# Patient Record
Sex: MALE | Race: WHITE | NOT HISPANIC OR LATINO | Employment: OTHER | URBAN - NONMETROPOLITAN AREA
[De-identification: names, ages, dates, MRNs, and addresses within clinical notes are randomized per-mention and may not be internally consistent; named-entity substitution may affect disease eponyms.]

---

## 2023-08-09 ENCOUNTER — APPOINTMENT (OUTPATIENT)
Dept: PRIMARY CARE | Facility: CLINIC | Age: 78
End: 2023-08-09
Payer: MEDICARE

## 2023-08-15 PROBLEM — I25.10 CORONARY ARTERY DISEASE INVOLVING NATIVE CORONARY ARTERY OF NATIVE HEART WITHOUT ANGINA PECTORIS: Status: ACTIVE | Noted: 2023-08-15

## 2023-08-15 PROBLEM — E11.9 DM2 (DIABETES MELLITUS, TYPE 2) (MULTI): Status: ACTIVE | Noted: 2023-08-15

## 2023-08-15 PROBLEM — G31.9 BRAIN ATROPHY (CMS-HCC): Status: ACTIVE | Noted: 2023-08-15

## 2023-08-15 PROBLEM — H53.2 DIPLOPIA: Status: ACTIVE | Noted: 2023-08-15

## 2023-08-15 PROBLEM — R91.1 NODULE OF RIGHT LUNG: Status: ACTIVE | Noted: 2023-08-15

## 2023-08-15 PROBLEM — N52.9 ED (ERECTILE DYSFUNCTION): Status: ACTIVE | Noted: 2023-08-15

## 2023-08-15 PROBLEM — I25.2 HISTORY OF ST ELEVATION MYOCARDIAL INFARCTION (STEMI): Status: ACTIVE | Noted: 2023-08-15

## 2023-08-15 PROBLEM — E78.5 HYPERLIPIDEMIA: Status: ACTIVE | Noted: 2023-08-15

## 2023-08-15 PROBLEM — L57.0 ACTINIC KERATOSES: Status: ACTIVE | Noted: 2023-08-15

## 2023-08-15 LAB
ALANINE AMINOTRANSFERASE (SGPT) (U/L) IN SER/PLAS: 40 U/L (ref 10–52)
ALBUMIN (G/DL) IN SER/PLAS: 4.3 G/DL (ref 3.4–5)
ALBUMIN (MG/L) IN URINE: 8.3 MG/L
ALBUMIN/CREATININE (UG/MG) IN URINE: 5.5 UG/MG CRT (ref 0–30)
ALKALINE PHOSPHATASE (U/L) IN SER/PLAS: 61 U/L (ref 33–136)
ANION GAP IN SER/PLAS: 11 MMOL/L (ref 10–20)
ASPARTATE AMINOTRANSFERASE (SGOT) (U/L) IN SER/PLAS: 21 U/L (ref 9–39)
BASOPHILS (10*3/UL) IN BLOOD BY AUTOMATED COUNT: 0.03 X10E9/L (ref 0–0.1)
BASOPHILS/100 LEUKOCYTES IN BLOOD BY AUTOMATED COUNT: 0.6 % (ref 0–2)
BILIRUBIN TOTAL (MG/DL) IN SER/PLAS: 0.7 MG/DL (ref 0–1.2)
CALCIUM (MG/DL) IN SER/PLAS: 9.5 MG/DL (ref 8.6–10.6)
CARBON DIOXIDE, TOTAL (MMOL/L) IN SER/PLAS: 27 MMOL/L (ref 21–32)
CHLORIDE (MMOL/L) IN SER/PLAS: 106 MMOL/L (ref 98–107)
CHOLESTEROL (MG/DL) IN SER/PLAS: 130 MG/DL (ref 0–199)
CHOLESTEROL IN HDL (MG/DL) IN SER/PLAS: 48.5 MG/DL
CHOLESTEROL/HDL RATIO: 2.7
CREATININE (MG/DL) IN SER/PLAS: 0.84 MG/DL (ref 0.5–1.3)
CREATININE (MG/DL) IN URINE: 151 MG/DL (ref 20–370)
EOSINOPHILS (10*3/UL) IN BLOOD BY AUTOMATED COUNT: 0.09 X10E9/L (ref 0–0.4)
EOSINOPHILS/100 LEUKOCYTES IN BLOOD BY AUTOMATED COUNT: 1.9 % (ref 0–6)
ERYTHROCYTE DISTRIBUTION WIDTH (RATIO) BY AUTOMATED COUNT: 12.5 % (ref 11.5–14.5)
ERYTHROCYTE MEAN CORPUSCULAR HEMOGLOBIN CONCENTRATION (G/DL) BY AUTOMATED: 32.8 G/DL (ref 32–36)
ERYTHROCYTE MEAN CORPUSCULAR VOLUME (FL) BY AUTOMATED COUNT: 103 FL (ref 80–100)
ERYTHROCYTES (10*6/UL) IN BLOOD BY AUTOMATED COUNT: 4.13 X10E12/L (ref 4.5–5.9)
ESTIMATED AVERAGE GLUCOSE FOR HBA1C: 151 MG/DL
GFR MALE: 89 ML/MIN/1.73M2
GLUCOSE (MG/DL) IN SER/PLAS: 117 MG/DL (ref 74–99)
HEMATOCRIT (%) IN BLOOD BY AUTOMATED COUNT: 42.7 % (ref 41–52)
HEMOGLOBIN (G/DL) IN BLOOD: 14 G/DL (ref 13.5–17.5)
HEMOGLOBIN A1C/HEMOGLOBIN TOTAL IN BLOOD: 6.9 %
IMMATURE GRANULOCYTES/100 LEUKOCYTES IN BLOOD BY AUTOMATED COUNT: 0.2 % (ref 0–0.9)
LDL: 66 MG/DL (ref 0–99)
LEUKOCYTES (10*3/UL) IN BLOOD BY AUTOMATED COUNT: 4.9 X10E9/L (ref 4.4–11.3)
LYMPHOCYTES (10*3/UL) IN BLOOD BY AUTOMATED COUNT: 1.45 X10E9/L (ref 0.8–3)
LYMPHOCYTES/100 LEUKOCYTES IN BLOOD BY AUTOMATED COUNT: 29.8 % (ref 13–44)
MONOCYTES (10*3/UL) IN BLOOD BY AUTOMATED COUNT: 0.55 X10E9/L (ref 0.05–0.8)
MONOCYTES/100 LEUKOCYTES IN BLOOD BY AUTOMATED COUNT: 11.3 % (ref 2–10)
NEUTROPHILS (10*3/UL) IN BLOOD BY AUTOMATED COUNT: 2.73 X10E9/L (ref 1.6–5.5)
NEUTROPHILS/100 LEUKOCYTES IN BLOOD BY AUTOMATED COUNT: 56.2 % (ref 40–80)
NRBC (PER 100 WBCS) BY AUTOMATED COUNT: 0 /100 WBC (ref 0–0)
PLATELETS (10*3/UL) IN BLOOD AUTOMATED COUNT: 182 X10E9/L (ref 150–450)
POTASSIUM (MMOL/L) IN SER/PLAS: 4.5 MMOL/L (ref 3.5–5.3)
PROTEIN TOTAL: 7.4 G/DL (ref 6.4–8.2)
SODIUM (MMOL/L) IN SER/PLAS: 139 MMOL/L (ref 136–145)
TRIGLYCERIDE (MG/DL) IN SER/PLAS: 80 MG/DL (ref 0–149)
UREA NITROGEN (MG/DL) IN SER/PLAS: 20 MG/DL (ref 6–23)
VLDL: 16 MG/DL (ref 0–40)

## 2023-08-15 RX ORDER — ATORVASTATIN CALCIUM 80 MG/1
TABLET, FILM COATED ORAL
COMMUNITY
Start: 2022-02-03

## 2023-08-15 RX ORDER — SILDENAFIL 100 MG/1
TABLET, FILM COATED ORAL
COMMUNITY
End: 2024-02-16 | Stop reason: SDUPTHER

## 2023-08-15 RX ORDER — ASPIRIN 81 MG/1
1 TABLET ORAL DAILY
COMMUNITY
Start: 2022-02-09

## 2023-08-15 RX ORDER — CARVEDILOL 6.25 MG/1
TABLET ORAL 2 TIMES DAILY
COMMUNITY
Start: 2022-02-03

## 2023-08-16 ENCOUNTER — OFFICE VISIT (OUTPATIENT)
Dept: PRIMARY CARE | Facility: CLINIC | Age: 78
End: 2023-08-16
Payer: MEDICARE

## 2023-08-16 VITALS
TEMPERATURE: 97.9 F | WEIGHT: 221.8 LBS | SYSTOLIC BLOOD PRESSURE: 102 MMHG | HEIGHT: 69 IN | HEART RATE: 77 BPM | BODY MASS INDEX: 32.85 KG/M2 | OXYGEN SATURATION: 92 % | DIASTOLIC BLOOD PRESSURE: 65 MMHG

## 2023-08-16 DIAGNOSIS — E11.9 TYPE 2 DIABETES MELLITUS WITHOUT COMPLICATION, WITHOUT LONG-TERM CURRENT USE OF INSULIN (MULTI): ICD-10-CM

## 2023-08-16 DIAGNOSIS — E78.5 HYPERLIPIDEMIA, UNSPECIFIED HYPERLIPIDEMIA TYPE: ICD-10-CM

## 2023-08-16 DIAGNOSIS — I25.2 HISTORY OF ST ELEVATION MYOCARDIAL INFARCTION (STEMI): ICD-10-CM

## 2023-08-16 DIAGNOSIS — Z00.00 ROUTINE GENERAL MEDICAL EXAMINATION AT HEALTH CARE FACILITY: Primary | ICD-10-CM

## 2023-08-16 DIAGNOSIS — I25.10 CORONARY ARTERY DISEASE INVOLVING NATIVE CORONARY ARTERY OF NATIVE HEART WITHOUT ANGINA PECTORIS: ICD-10-CM

## 2023-08-16 PROCEDURE — G0439 PPPS, SUBSEQ VISIT: HCPCS | Performed by: FAMILY MEDICINE

## 2023-08-16 PROCEDURE — 1036F TOBACCO NON-USER: CPT | Performed by: FAMILY MEDICINE

## 2023-08-16 PROCEDURE — 1170F FXNL STATUS ASSESSED: CPT | Performed by: FAMILY MEDICINE

## 2023-08-16 PROCEDURE — 1159F MED LIST DOCD IN RCRD: CPT | Performed by: FAMILY MEDICINE

## 2023-08-16 RX ORDER — CALCIUM CARBONATE 200(500)MG
1 TABLET,CHEWABLE ORAL DAILY
COMMUNITY

## 2023-08-16 RX ORDER — CLOPIDOGREL BISULFATE 75 MG/1
75 TABLET ORAL DAILY
COMMUNITY
End: 2024-02-16 | Stop reason: ALTCHOICE

## 2023-08-16 ASSESSMENT — ACTIVITIES OF DAILY LIVING (ADL)
TAKING_MEDICATION: INDEPENDENT
DOING_HOUSEWORK: INDEPENDENT
MANAGING_FINANCES: INDEPENDENT
BATHING: INDEPENDENT
DRESSING: INDEPENDENT
GROCERY_SHOPPING: INDEPENDENT

## 2023-08-16 ASSESSMENT — PATIENT HEALTH QUESTIONNAIRE - PHQ9
1. LITTLE INTEREST OR PLEASURE IN DOING THINGS: NOT AT ALL
2. FEELING DOWN, DEPRESSED OR HOPELESS: NOT AT ALL
SUM OF ALL RESPONSES TO PHQ9 QUESTIONS 1 AND 2: 0

## 2023-08-16 ASSESSMENT — ENCOUNTER SYMPTOMS
OCCASIONAL FEELINGS OF UNSTEADINESS: 0
LOSS OF SENSATION IN FEET: 0
DEPRESSION: 0

## 2023-08-16 NOTE — PROGRESS NOTES
"Subjective   Reason for Visit: Torres Price is an 77 y.o. male here for a Medicare Wellness visit.          Reviewed all medications by prescribing practitioner or clinical pharmacist (such as prescriptions, OTCs, herbal therapies and supplements) and documented in the medical record.    HPI  Questions about  CPE vs.AWV     Dm2   Lab Results   Component Value Date    HGBA1C 6.9 (A) 08/14/2023   `    CAD  HL   [facility] cardioloogy   Exertional sob   Etoh dinner and hs .   Lipids - reviewed     Questions about -  cardiac rehab - can he restart/ resume     Patient Care Team:  Deanna Carranza MD as PCP - General (Family Medicine)  Deanna Carranza MD as PCP - [employer] PCP     Review of Systems   Constitutional:  Negative for fever and unexpected weight change.   HENT:  Negative for dental problem and sore throat.    Eyes:  Negative for visual disturbance.   Respiratory:  Positive for shortness of breath. Negative for cough.    Cardiovascular:  Negative for chest pain and palpitations.   Genitourinary:  Negative for dysuria and hematuria.   Musculoskeletal:  Negative for arthralgias and back pain.   Skin:  Negative for rash.   Neurological:  Negative for syncope, light-headedness and headaches.   Hematological:  Negative for adenopathy. Does not bruise/bleed easily.   Psychiatric/Behavioral:  Negative for sleep disturbance.        Objective   Vitals:  /65 (BP Location: Left arm, Patient Position: Sitting, BP Cuff Size: Large adult)   Pulse 77   Temp 36.6 °C (97.9 °F) (Temporal)   Ht 1.753 m (5' 9\")   Wt 101 kg (221 lb 12.8 oz)   SpO2 92%   BMI 32.75 kg/m²       Physical Exam  Vitals and nursing note reviewed.   Constitutional:       General: He is not in acute distress.     Appearance: Normal appearance.   Cardiovascular:      Heart sounds: Normal heart sounds.   Pulmonary:      Breath sounds: Normal breath sounds.   Abdominal:      General: Bowel sounds are normal.      Palpations: Abdomen is soft. "   Musculoskeletal:         General: Normal range of motion.      Cervical back: Neck supple.   Neurological:      General: No focal deficit present.      Mental Status: He is alert and oriented to person, place, and time.         Assessment/Plan   Problem List Items Addressed This Visit          High    Coronary artery disease involving native coronary artery of native heart without angina pectoris    Relevant Medications    ticagrelor (Brilinta) 90 mg tablet    sildenafil (Viagra) 100 mg tablet    carvedilol (Coreg) 6.25 mg tablet    clopidogrel (Plavix) 75 mg tablet    DM2 (diabetes mellitus, type 2) (CMS/[facility])    History of ST elevation myocardial infarction (STEMI)    Hyperlipidemia     Other Visit Diagnoses       Routine general medical examination at health care facility    -  Primary          Wellness  - preventive care and health maintenance reviewed and discussed   CAD  - urged to make appt w cardiology regarding his symptoms .  Continue current medications as prescribed

## 2023-08-18 ASSESSMENT — ENCOUNTER SYMPTOMS
ADENOPATHY: 0
ARTHRALGIAS: 0
UNEXPECTED WEIGHT CHANGE: 0
COUGH: 0
SLEEP DISTURBANCE: 0
LIGHT-HEADEDNESS: 0
DYSURIA: 0
SORE THROAT: 0
SHORTNESS OF BREATH: 1
BRUISES/BLEEDS EASILY: 0
FEVER: 0
HEMATURIA: 0
PALPITATIONS: 0
HEADACHES: 0
BACK PAIN: 0

## 2024-02-01 ENCOUNTER — TELEPHONE (OUTPATIENT)
Dept: PRIMARY CARE | Facility: CLINIC | Age: 79
End: 2024-02-01

## 2024-02-01 DIAGNOSIS — E11.9 TYPE 2 DIABETES MELLITUS WITHOUT COMPLICATION, WITHOUT LONG-TERM CURRENT USE OF INSULIN (MULTI): Primary | ICD-10-CM

## 2024-02-01 DIAGNOSIS — E78.5 HYPERLIPIDEMIA, UNSPECIFIED HYPERLIPIDEMIA TYPE: ICD-10-CM

## 2024-02-01 NOTE — TELEPHONE ENCOUNTER
Patient has upcoming appointment, asking if lab orders can be placed prior to seeing you     Would like a call once ordered

## 2024-02-06 ENCOUNTER — LAB (OUTPATIENT)
Dept: LAB | Facility: LAB | Age: 79
End: 2024-02-06
Payer: MEDICARE

## 2024-02-06 DIAGNOSIS — E78.5 HYPERLIPIDEMIA, UNSPECIFIED HYPERLIPIDEMIA TYPE: ICD-10-CM

## 2024-02-06 DIAGNOSIS — E11.9 TYPE 2 DIABETES MELLITUS WITHOUT COMPLICATION, WITHOUT LONG-TERM CURRENT USE OF INSULIN (MULTI): ICD-10-CM

## 2024-02-06 LAB
ALBUMIN SERPL BCP-MCNC: 4 G/DL (ref 3.4–5)
ALP SERPL-CCNC: 67 U/L (ref 33–136)
ALT SERPL W P-5'-P-CCNC: 36 U/L (ref 10–52)
ANION GAP SERPL CALC-SCNC: 12 MMOL/L (ref 10–20)
AST SERPL W P-5'-P-CCNC: 23 U/L (ref 9–39)
BILIRUB SERPL-MCNC: 0.7 MG/DL (ref 0–1.2)
BUN SERPL-MCNC: 16 MG/DL (ref 6–23)
CALCIUM SERPL-MCNC: 9.5 MG/DL (ref 8.6–10.6)
CHLORIDE SERPL-SCNC: 105 MMOL/L (ref 98–107)
CHOLEST SERPL-MCNC: 116 MG/DL (ref 0–199)
CHOLESTEROL/HDL RATIO: 2.5
CO2 SERPL-SCNC: 27 MMOL/L (ref 21–32)
CREAT SERPL-MCNC: 0.91 MG/DL (ref 0.5–1.3)
EGFRCR SERPLBLD CKD-EPI 2021: 86 ML/MIN/1.73M*2
EST. AVERAGE GLUCOSE BLD GHB EST-MCNC: 169 MG/DL
GLUCOSE SERPL-MCNC: 130 MG/DL (ref 74–99)
HBA1C MFR BLD: 7.5 %
HDLC SERPL-MCNC: 46.4 MG/DL
LDLC SERPL CALC-MCNC: 53 MG/DL
NON HDL CHOLESTEROL: 70 MG/DL (ref 0–149)
POTASSIUM SERPL-SCNC: 4.3 MMOL/L (ref 3.5–5.3)
PROT SERPL-MCNC: 7.3 G/DL (ref 6.4–8.2)
SODIUM SERPL-SCNC: 140 MMOL/L (ref 136–145)
TRIGL SERPL-MCNC: 82 MG/DL (ref 0–149)
VLDL: 16 MG/DL (ref 0–40)

## 2024-02-06 PROCEDURE — 80061 LIPID PANEL: CPT

## 2024-02-06 PROCEDURE — 36415 COLL VENOUS BLD VENIPUNCTURE: CPT

## 2024-02-06 PROCEDURE — 80053 COMPREHEN METABOLIC PANEL: CPT

## 2024-02-06 PROCEDURE — 83036 HEMOGLOBIN GLYCOSYLATED A1C: CPT

## 2024-02-06 NOTE — TELEPHONE ENCOUNTER
Pt went and had his labs done today and said they did not do a urine Albumin. He said the reason he was asking is that he has been having some lower back pain but really notices some pain when he is cutting firewood and not sure if he should have this test done?   I asked about uti symptoms and he said it;s not that and that his son told him may be related to taking Atorvastatin and having an alcoholic beverage. But he does this separately. Pt is aware you are out of the office today and said ok to wait for you and he has an apt with you next week.

## 2024-02-07 NOTE — TELEPHONE ENCOUNTER
I did not order a urine albumin because the urine albumin  was normal  8/14/2023  .  We can discuss this at next visit  . If he can't wait till then, set up a virtual

## 2024-02-16 ENCOUNTER — OFFICE VISIT (OUTPATIENT)
Dept: PRIMARY CARE | Facility: CLINIC | Age: 79
End: 2024-02-16
Payer: MEDICARE

## 2024-02-16 VITALS
BODY MASS INDEX: 33.26 KG/M2 | TEMPERATURE: 97.7 F | WEIGHT: 225.2 LBS | SYSTOLIC BLOOD PRESSURE: 105 MMHG | OXYGEN SATURATION: 93 % | HEART RATE: 67 BPM | DIASTOLIC BLOOD PRESSURE: 68 MMHG

## 2024-02-16 DIAGNOSIS — M25.551 PAIN OF RIGHT HIP: ICD-10-CM

## 2024-02-16 DIAGNOSIS — M51.36 DEGENERATIVE DISC DISEASE, LUMBAR: ICD-10-CM

## 2024-02-16 DIAGNOSIS — E11.9 TYPE 2 DIABETES MELLITUS WITHOUT COMPLICATION, WITHOUT LONG-TERM CURRENT USE OF INSULIN (MULTI): Primary | ICD-10-CM

## 2024-02-16 DIAGNOSIS — N52.9 ERECTILE DYSFUNCTION, UNSPECIFIED ERECTILE DYSFUNCTION TYPE: ICD-10-CM

## 2024-02-16 DIAGNOSIS — E78.5 HYPERLIPIDEMIA, UNSPECIFIED HYPERLIPIDEMIA TYPE: ICD-10-CM

## 2024-02-16 DIAGNOSIS — Z11.59 NEED FOR HEPATITIS C SCREENING TEST: ICD-10-CM

## 2024-02-16 PROCEDURE — 99214 OFFICE O/P EST MOD 30 MIN: CPT | Performed by: FAMILY MEDICINE

## 2024-02-16 PROCEDURE — 1159F MED LIST DOCD IN RCRD: CPT | Performed by: FAMILY MEDICINE

## 2024-02-16 PROCEDURE — 1036F TOBACCO NON-USER: CPT | Performed by: FAMILY MEDICINE

## 2024-02-16 RX ORDER — SILDENAFIL 100 MG/1
100 TABLET, FILM COATED ORAL AS NEEDED
Qty: 30 TABLET | Refills: 1 | Status: SHIPPED | OUTPATIENT
Start: 2024-02-16

## 2024-02-16 NOTE — PROGRESS NOTES
Subjective   Patient ID: Torres Price is a 78 y.o. male who presents for Follow-up (Pt is here for a 6 month follow up.  Pt states he still has lower back pain. Both sides with lifting and bending. He is concerned with his right hip. Painful to cross leg. ).  HPI  Pt here for 6 month visit.      Interval Health :  feels okay.  Has more pain than he used to , doing usual things .  Also notices a change in leg / right hip .     No ER visits, surgeries, hospitalizations ,new doctors.     Had Cardiology c/s, see notes in EMR  .  Off Plavix.     Requests refill on Sildenafil, to [facility]        Review of Systems    Objective   /68 (BP Location: Right arm, Patient Position: Sitting, BP Cuff Size: Large adult)   Pulse 67   Temp 36.5 °C (97.7 °F) (Temporal)   Wt 102 kg (225 lb 3.2 oz)   SpO2 93%   BMI 33.26 kg/m²     Physical Exam  Vitals reviewed.   Constitutional:       General: He is not in acute distress.  Cardiovascular:      Rate and Rhythm: Normal rate and regular rhythm.      Heart sounds: Normal heart sounds.   Pulmonary:      Effort: Pulmonary effort is normal.      Breath sounds: No wheezing or rales.   Neurological:      General: No focal deficit present.      Mental Status: He is alert.     Slr negative. Limited internal and ext rotation, right hip .  Pain over lumbar parasp muscles.   Reflexes 2/4 bilaterally.       Assessment/Plan   Problem List Items Addressed This Visit          High    DM2 (diabetes mellitus, type 2) (CMS/[facility]) - Primary    Relevant Orders    Follow Up In [facility] - PCP - Medicare Annual    Albumin , Urine Random    Hemoglobin A1C    Comprehensive Metabolic Panel    Hyperlipidemia    Relevant Orders    Follow Up In [facility] - PCP - Medicare Annual    CBC and Auto Differential    Lipid Panel       Medium    ED (erectile dysfunction)    Relevant Medications    sildenafil (Viagra) 100 mg tablet    Other Relevant Orders    CBC and Auto Differential     Other  Visit Diagnoses       Degenerative disc disease, lumbar        Relevant Orders    Referral to Physical Therapy    Need for hepatitis C screening test        Relevant Orders    Hepatitis C Antibody    Pain of right hip            DM2  Lab Results   Component Value Date    HGBA1C 7.5 (H) 02/06/2024   Recommend low sugar diet ,  avoid alcohol .   Goal, less than 7%     Hyperlipidemia  At goal.   Continue current regimen.   Meds renewed by Cardiology     ED  Med renewed    Degen Changes, back   Has questions about treatment . Interested in therapy .  He has a place in [place] he may go to. Will give him external referral.     Hip abnormality   Likely arthritis .  Like in his back .    Will monitor sxs.       6mo follow up  ,AWV   He willcontact a provider for Physical Therapy  Lab andurine prior to next appt      JORDON Carranza MD

## 2024-08-09 ENCOUNTER — TELEPHONE (OUTPATIENT)
Dept: PRIMARY CARE | Facility: CLINIC | Age: 79
End: 2024-08-09
Payer: MEDICARE

## 2024-08-09 DIAGNOSIS — R71.8 ABNORMAL RBC: ICD-10-CM

## 2024-08-09 DIAGNOSIS — R53.83 OTHER FATIGUE: Primary | ICD-10-CM

## 2024-08-09 NOTE — TELEPHONE ENCOUNTER
Patient asking if B12 and iron labs can be ordered for his upcoming appointment    He has been feeling very tired lately

## 2024-08-13 ENCOUNTER — LAB (OUTPATIENT)
Dept: LAB | Facility: LAB | Age: 79
End: 2024-08-13
Payer: MEDICARE

## 2024-08-13 DIAGNOSIS — E78.5 HYPERLIPIDEMIA, UNSPECIFIED HYPERLIPIDEMIA TYPE: ICD-10-CM

## 2024-08-13 DIAGNOSIS — R53.83 OTHER FATIGUE: ICD-10-CM

## 2024-08-13 DIAGNOSIS — Z11.59 NEED FOR HEPATITIS C SCREENING TEST: ICD-10-CM

## 2024-08-13 DIAGNOSIS — R71.8 ABNORMAL RBC: ICD-10-CM

## 2024-08-13 DIAGNOSIS — N52.9 ERECTILE DYSFUNCTION, UNSPECIFIED ERECTILE DYSFUNCTION TYPE: ICD-10-CM

## 2024-08-13 DIAGNOSIS — E11.9 TYPE 2 DIABETES MELLITUS WITHOUT COMPLICATION, WITHOUT LONG-TERM CURRENT USE OF INSULIN (MULTI): ICD-10-CM

## 2024-08-13 PROCEDURE — 82043 UR ALBUMIN QUANTITATIVE: CPT

## 2024-08-13 PROCEDURE — 83540 ASSAY OF IRON: CPT

## 2024-08-13 PROCEDURE — 83550 IRON BINDING TEST: CPT

## 2024-08-13 PROCEDURE — 82607 VITAMIN B-12: CPT

## 2024-08-13 PROCEDURE — 80061 LIPID PANEL: CPT

## 2024-08-13 PROCEDURE — 86803 HEPATITIS C AB TEST: CPT

## 2024-08-13 PROCEDURE — 82570 ASSAY OF URINE CREATININE: CPT

## 2024-08-13 PROCEDURE — 85025 COMPLETE CBC W/AUTO DIFF WBC: CPT

## 2024-08-13 PROCEDURE — 83036 HEMOGLOBIN GLYCOSYLATED A1C: CPT

## 2024-08-13 PROCEDURE — 80053 COMPREHEN METABOLIC PANEL: CPT

## 2024-08-13 PROCEDURE — 36415 COLL VENOUS BLD VENIPUNCTURE: CPT

## 2024-08-14 LAB
ALBUMIN SERPL BCP-MCNC: 4.1 G/DL (ref 3.4–5)
ALP SERPL-CCNC: 60 U/L (ref 33–136)
ALT SERPL W P-5'-P-CCNC: 38 U/L (ref 10–52)
ANION GAP SERPL CALC-SCNC: 11 MMOL/L (ref 10–20)
AST SERPL W P-5'-P-CCNC: 20 U/L (ref 9–39)
BASOPHILS # BLD AUTO: 0.05 X10*3/UL (ref 0–0.1)
BASOPHILS NFR BLD AUTO: 1.1 %
BILIRUB SERPL-MCNC: 0.6 MG/DL (ref 0–1.2)
BUN SERPL-MCNC: 20 MG/DL (ref 6–23)
CALCIUM SERPL-MCNC: 9.3 MG/DL (ref 8.6–10.6)
CHLORIDE SERPL-SCNC: 103 MMOL/L (ref 98–107)
CHOLEST SERPL-MCNC: 115 MG/DL (ref 0–199)
CHOLESTEROL/HDL RATIO: 2.9
CO2 SERPL-SCNC: 29 MMOL/L (ref 21–32)
CREAT SERPL-MCNC: 0.84 MG/DL (ref 0.5–1.3)
CREAT UR-MCNC: 129.9 MG/DL (ref 20–370)
EGFRCR SERPLBLD CKD-EPI 2021: 89 ML/MIN/1.73M*2
EOSINOPHIL # BLD AUTO: 0.08 X10*3/UL (ref 0–0.4)
EOSINOPHIL NFR BLD AUTO: 1.8 %
ERYTHROCYTE [DISTWIDTH] IN BLOOD BY AUTOMATED COUNT: 12.3 % (ref 11.5–14.5)
EST. AVERAGE GLUCOSE BLD GHB EST-MCNC: 166 MG/DL
GLUCOSE SERPL-MCNC: 129 MG/DL (ref 74–99)
HBA1C MFR BLD: 7.4 %
HCT VFR BLD AUTO: 43.1 % (ref 41–52)
HCV AB SER QL: NONREACTIVE
HDLC SERPL-MCNC: 39.6 MG/DL
HGB BLD-MCNC: 14.1 G/DL (ref 13.5–17.5)
IMM GRANULOCYTES # BLD AUTO: 0 X10*3/UL (ref 0–0.5)
IMM GRANULOCYTES NFR BLD AUTO: 0 % (ref 0–0.9)
IRON SATN MFR SERPL: 35 % (ref 25–45)
IRON SERPL-MCNC: 103 UG/DL (ref 35–150)
LDLC SERPL CALC-MCNC: 59 MG/DL
LYMPHOCYTES # BLD AUTO: 1.49 X10*3/UL (ref 0.8–3)
LYMPHOCYTES NFR BLD AUTO: 33.9 %
MCH RBC QN AUTO: 32.3 PG (ref 26–34)
MCHC RBC AUTO-ENTMCNC: 32.7 G/DL (ref 32–36)
MCV RBC AUTO: 99 FL (ref 80–100)
MICROALBUMIN UR-MCNC: <7 MG/L
MICROALBUMIN/CREAT UR: NORMAL MG/G{CREAT}
MONOCYTES # BLD AUTO: 0.53 X10*3/UL (ref 0.05–0.8)
MONOCYTES NFR BLD AUTO: 12 %
NEUTROPHILS # BLD AUTO: 2.25 X10*3/UL (ref 1.6–5.5)
NEUTROPHILS NFR BLD AUTO: 51.2 %
NON HDL CHOLESTEROL: 75 MG/DL (ref 0–149)
NRBC BLD-RTO: 0 /100 WBCS (ref 0–0)
PLATELET # BLD AUTO: 185 X10*3/UL (ref 150–450)
POTASSIUM SERPL-SCNC: 4.2 MMOL/L (ref 3.5–5.3)
PROT SERPL-MCNC: 7 G/DL (ref 6.4–8.2)
RBC # BLD AUTO: 4.36 X10*6/UL (ref 4.5–5.9)
SODIUM SERPL-SCNC: 139 MMOL/L (ref 136–145)
TIBC SERPL-MCNC: 297 UG/DL (ref 240–445)
TRIGL SERPL-MCNC: 84 MG/DL (ref 0–149)
UIBC SERPL-MCNC: 194 UG/DL (ref 110–370)
VIT B12 SERPL-MCNC: 288 PG/ML (ref 211–911)
VLDL: 17 MG/DL (ref 0–40)
WBC # BLD AUTO: 4.4 X10*3/UL (ref 4.4–11.3)

## 2024-08-22 ENCOUNTER — APPOINTMENT (OUTPATIENT)
Dept: PRIMARY CARE | Facility: CLINIC | Age: 79
End: 2024-08-22
Payer: MEDICARE

## 2024-08-22 VITALS
HEIGHT: 69 IN | WEIGHT: 219.2 LBS | TEMPERATURE: 97.5 F | HEART RATE: 73 BPM | RESPIRATION RATE: 12 BRPM | BODY MASS INDEX: 32.47 KG/M2 | DIASTOLIC BLOOD PRESSURE: 63 MMHG | SYSTOLIC BLOOD PRESSURE: 102 MMHG | OXYGEN SATURATION: 92 %

## 2024-08-22 DIAGNOSIS — E78.5 HYPERLIPIDEMIA, UNSPECIFIED HYPERLIPIDEMIA TYPE: ICD-10-CM

## 2024-08-22 DIAGNOSIS — R53.83 OTHER FATIGUE: ICD-10-CM

## 2024-08-22 DIAGNOSIS — I25.10 CORONARY ARTERY DISEASE INVOLVING NATIVE CORONARY ARTERY OF NATIVE HEART WITHOUT ANGINA PECTORIS: ICD-10-CM

## 2024-08-22 DIAGNOSIS — E53.8 B12 DEFICIENCY: ICD-10-CM

## 2024-08-22 DIAGNOSIS — E11.9 TYPE 2 DIABETES MELLITUS WITHOUT COMPLICATION, WITHOUT LONG-TERM CURRENT USE OF INSULIN (MULTI): ICD-10-CM

## 2024-08-22 DIAGNOSIS — N52.9 ERECTILE DYSFUNCTION, UNSPECIFIED ERECTILE DYSFUNCTION TYPE: ICD-10-CM

## 2024-08-22 DIAGNOSIS — Z00.00 ROUTINE GENERAL MEDICAL EXAMINATION AT HEALTH CARE FACILITY: Primary | ICD-10-CM

## 2024-08-22 PROCEDURE — 1036F TOBACCO NON-USER: CPT | Performed by: FAMILY MEDICINE

## 2024-08-22 PROCEDURE — 1160F RVW MEDS BY RX/DR IN RCRD: CPT | Performed by: FAMILY MEDICINE

## 2024-08-22 PROCEDURE — 1170F FXNL STATUS ASSESSED: CPT | Performed by: FAMILY MEDICINE

## 2024-08-22 PROCEDURE — 1158F ADVNC CARE PLAN TLK DOCD: CPT | Performed by: FAMILY MEDICINE

## 2024-08-22 PROCEDURE — 1159F MED LIST DOCD IN RCRD: CPT | Performed by: FAMILY MEDICINE

## 2024-08-22 PROCEDURE — 1123F ACP DISCUSS/DSCN MKR DOCD: CPT | Performed by: FAMILY MEDICINE

## 2024-08-22 PROCEDURE — G0439 PPPS, SUBSEQ VISIT: HCPCS | Performed by: FAMILY MEDICINE

## 2024-08-22 RX ORDER — METFORMIN HYDROCHLORIDE 500 MG/1
500 TABLET, EXTENDED RELEASE ORAL
Qty: 100 TABLET | Refills: 1 | Status: SHIPPED | OUTPATIENT
Start: 2024-08-22 | End: 2025-03-10

## 2024-08-22 RX ORDER — SILDENAFIL 100 MG/1
100 TABLET, FILM COATED ORAL AS NEEDED
Qty: 30 TABLET | Refills: 1 | Status: SHIPPED | OUTPATIENT
Start: 2024-08-22

## 2024-08-22 ASSESSMENT — ACTIVITIES OF DAILY LIVING (ADL)
GROCERY_SHOPPING: INDEPENDENT
BATHING: INDEPENDENT
MANAGING_FINANCES: INDEPENDENT
TAKING_MEDICATION: INDEPENDENT
DOING_HOUSEWORK: INDEPENDENT
DRESSING: INDEPENDENT

## 2024-08-22 ASSESSMENT — PATIENT HEALTH QUESTIONNAIRE - PHQ9
2. FEELING DOWN, DEPRESSED OR HOPELESS: SEVERAL DAYS
8. MOVING OR SPEAKING SO SLOWLY THAT OTHER PEOPLE COULD HAVE NOTICED. OR THE OPPOSITE, BEING SO FIGETY OR RESTLESS THAT YOU HAVE BEEN MOVING AROUND A LOT MORE THAN USUAL: NOT AT ALL
SUM OF ALL RESPONSES TO PHQ9 QUESTIONS 1 AND 2: 2
9. THOUGHTS THAT YOU WOULD BE BETTER OFF DEAD, OR OF HURTING YOURSELF: NOT AT ALL
1. LITTLE INTEREST OR PLEASURE IN DOING THINGS: SEVERAL DAYS
3. TROUBLE FALLING OR STAYING ASLEEP OR SLEEPING TOO MUCH: NOT AT ALL
4. FEELING TIRED OR HAVING LITTLE ENERGY: MORE THAN HALF THE DAYS
5. POOR APPETITE OR OVEREATING: NOT AT ALL
6. FEELING BAD ABOUT YOURSELF - OR THAT YOU ARE A FAILURE OR HAVE LET YOURSELF OR YOUR FAMILY DOWN: NOT AT ALL
SUM OF ALL RESPONSES TO PHQ QUESTIONS 1-9: 4
7. TROUBLE CONCENTRATING ON THINGS, SUCH AS READING THE NEWSPAPER OR WATCHING TELEVISION: NOT AT ALL

## 2024-08-22 ASSESSMENT — ANXIETY QUESTIONNAIRES
GAD7 TOTAL SCORE: 2
2. NOT BEING ABLE TO STOP OR CONTROL WORRYING: NOT AT ALL
3. WORRYING TOO MUCH ABOUT DIFFERENT THINGS: NOT AT ALL
1. FEELING NERVOUS, ANXIOUS, OR ON EDGE: NOT AT ALL
4. TROUBLE RELAXING: NOT AT ALL
7. FEELING AFRAID AS IF SOMETHING AWFUL MIGHT HAPPEN: SEVERAL DAYS
6. BECOMING EASILY ANNOYED OR IRRITABLE: SEVERAL DAYS
5. BEING SO RESTLESS THAT IT IS HARD TO SIT STILL: NOT AT ALL

## 2024-08-22 NOTE — PROGRESS NOTES
"Subjective   Reason for Visit: Torres Price is an 78 y.o. male here for a Medicare Wellness visit.     Reviewed all medications by prescribing practitioner or clinical pharmacist (such as prescriptions, OTCs, herbal therapies and supplements) and documented in the medical record.    HPI  Pt here for wellness .  Last CPE 1 year ago .     HL/ DM2 / ED/ Degen Back /  NEW Fatigue    Interval  appt with Cardiology     Interval Health :   overall good. No fall/ surgery/  hospitalizations    Interval Changes in PMHx. PSHx, FMHx :  denies     Concerns/Questions:   STATES:  --  the chart says diabetes, but I don'ttakeinsulin or anything, an idont'think I have allthe sxs ofit  ;   -- the red cell coutn was low and that could be contributing to something  -- started taking an iron and b12 supplement bc of his dtr , who has long covid( ?)  \on B12 for about a month  -- long Covid  -  states his dtr has it terribly ,and anything she does  wipes her out.  He doesn't have that, he has fatigue .     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     Exercise  not regular .  Has a club membership  but has no time    Meals : wife does this .   No difficulty paying bills / shopping/  banking /etc-  he does these things     Occupation . Movies .  Acting ,  artist, , .     Patient Care Team:  Deanna Carranza MD as PCP - General (Family Medicine)  Deanna Carranza MD as PCP - [employer] PCP     Review of Systems  Asked about his memory    States it is okay  ; he does notice it takes a bit longer for him to memorize, ie memorize a poem   Non smoker   No reg exercise  .   Hard of hearing . Not ready to get a hearing aid, but knows he needs one.     Objective   Vitals:  /63 (BP Location: Right arm, Patient Position: Sitting, BP Cuff Size: " "Adult)   Pulse 73   Temp 36.4 °C (97.5 °F)   Resp 12   Ht 1.753 m (5' 9\")   Wt 99.4 kg (219 lb 3.2 oz)   SpO2 92%   BMI 32.37 kg/m²       Physical Exam hard of hearing      Assessment/Plan   Problem List Items Addressed This Visit             ICD-10-CM       High    Hyperlipidemia E78.5    DM2 (diabetes mellitus, type 2) (Multi) E11.9    Relevant Medications    metFORMIN XR (Glucophage-XR) 500 mg 24 hr tablet    Other Relevant Orders    Follow Up In [facility] - PCP    Comprehensive Metabolic Panel    Hemoglobin A1C    Hemoglobin A1C    Coronary artery disease involving native coronary artery of native heart without angina pectoris I25.10    Relevant Medications    sildenafil (Viagra) 100 mg tablet       Medium    ED (erectile dysfunction) N52.9    Relevant Medications    sildenafil (Viagra) 100 mg tablet     Other Visit Diagnoses         Codes    Routine general medical examination at health care facility    -  Primary Z00.00    B12 deficiency     E53.8    Relevant Orders    Vitamin B12          LAB RESULTS   Low Nl B12   Nl Iron   A1c = 7.4%   CBC, Alb,  liver, GFR n l           Wellness  - preventive care and health maintenance reviewed and discussed     DM2 , not currently on med  - start medication    - continue Statin   - continue healthy diet   - recommend more exercise/ movement    HL   -conintue current medication     Fatigue / low motivation   - low normal B12 . Continue oral tx , increase to twice as much   - improve sugars  - monitor sxs  - consider sleep study , psychology c/s     CAD    - Will followup with Specialist   Asa, Statin , b blocker    Followup3  mo w labs           "

## 2024-11-27 ENCOUNTER — APPOINTMENT (OUTPATIENT)
Dept: PRIMARY CARE | Facility: CLINIC | Age: 79
End: 2024-11-27
Payer: MEDICARE

## 2024-12-06 ENCOUNTER — APPOINTMENT (OUTPATIENT)
Dept: PRIMARY CARE | Facility: CLINIC | Age: 79
End: 2024-12-06
Payer: MEDICARE

## 2024-12-09 ENCOUNTER — TELEPHONE (OUTPATIENT)
Dept: PRIMARY CARE | Facility: CLINIC | Age: 79
End: 2024-12-09
Payer: MEDICARE

## 2024-12-09 DIAGNOSIS — E78.5 HYPERLIPIDEMIA, UNSPECIFIED HYPERLIPIDEMIA TYPE: Primary | ICD-10-CM

## 2024-12-09 NOTE — TELEPHONE ENCOUNTER
Patient called and said he needs a lab order for a lipid panel.  He said its the only one missing in his active orders.

## 2024-12-16 ENCOUNTER — LAB (OUTPATIENT)
Dept: LAB | Facility: LAB | Age: 79
End: 2024-12-16
Payer: MEDICARE

## 2024-12-16 DIAGNOSIS — E78.5 HYPERLIPIDEMIA, UNSPECIFIED HYPERLIPIDEMIA TYPE: ICD-10-CM

## 2024-12-16 DIAGNOSIS — E11.9 TYPE 2 DIABETES MELLITUS WITHOUT COMPLICATION, WITHOUT LONG-TERM CURRENT USE OF INSULIN (MULTI): ICD-10-CM

## 2024-12-16 DIAGNOSIS — R53.83 OTHER FATIGUE: ICD-10-CM

## 2024-12-16 DIAGNOSIS — E53.8 B12 DEFICIENCY: ICD-10-CM

## 2024-12-16 PROCEDURE — 36415 COLL VENOUS BLD VENIPUNCTURE: CPT

## 2024-12-16 PROCEDURE — 83036 HEMOGLOBIN GLYCOSYLATED A1C: CPT

## 2024-12-16 PROCEDURE — 80061 LIPID PANEL: CPT

## 2024-12-16 PROCEDURE — 84443 ASSAY THYROID STIM HORMONE: CPT

## 2024-12-16 PROCEDURE — 82607 VITAMIN B-12: CPT

## 2024-12-17 LAB
CHOLEST SERPL-MCNC: 125 MG/DL (ref 0–199)
CHOLESTEROL/HDL RATIO: 3.2
EST. AVERAGE GLUCOSE BLD GHB EST-MCNC: 171 MG/DL
HBA1C MFR BLD: 7.6 %
HDLC SERPL-MCNC: 38.6 MG/DL
LDLC SERPL CALC-MCNC: 68 MG/DL
NON HDL CHOLESTEROL: 86 MG/DL (ref 0–149)
TRIGL SERPL-MCNC: 92 MG/DL (ref 0–149)
TSH SERPL-ACNC: 1.5 MIU/L (ref 0.44–3.98)
VIT B12 SERPL-MCNC: 550 PG/ML (ref 211–911)
VLDL: 18 MG/DL (ref 0–40)

## 2024-12-19 ENCOUNTER — APPOINTMENT (OUTPATIENT)
Dept: PRIMARY CARE | Facility: CLINIC | Age: 79
End: 2024-12-19
Payer: MEDICARE

## 2024-12-19 VITALS
WEIGHT: 219.3 LBS | RESPIRATION RATE: 14 BRPM | OXYGEN SATURATION: 94 % | BODY MASS INDEX: 32.38 KG/M2 | DIASTOLIC BLOOD PRESSURE: 65 MMHG | TEMPERATURE: 97.7 F | SYSTOLIC BLOOD PRESSURE: 103 MMHG | HEART RATE: 64 BPM

## 2024-12-19 DIAGNOSIS — E11.9 TYPE 2 DIABETES MELLITUS WITHOUT COMPLICATION, WITHOUT LONG-TERM CURRENT USE OF INSULIN (MULTI): ICD-10-CM

## 2024-12-19 PROCEDURE — 1123F ACP DISCUSS/DSCN MKR DOCD: CPT | Performed by: FAMILY MEDICINE

## 2024-12-19 PROCEDURE — 90662 IIV NO PRSV INCREASED AG IM: CPT | Performed by: FAMILY MEDICINE

## 2024-12-19 PROCEDURE — G0008 ADMIN INFLUENZA VIRUS VAC: HCPCS | Performed by: FAMILY MEDICINE

## 2024-12-19 PROCEDURE — 99213 OFFICE O/P EST LOW 20 MIN: CPT | Performed by: FAMILY MEDICINE

## 2024-12-19 PROCEDURE — 1159F MED LIST DOCD IN RCRD: CPT | Performed by: FAMILY MEDICINE

## 2024-12-19 RX ORDER — METFORMIN HYDROCHLORIDE 500 MG/1
500 TABLET, EXTENDED RELEASE ORAL
Qty: 100 TABLET | Refills: 1 | Status: SHIPPED | OUTPATIENT
Start: 2024-12-19 | End: 2025-07-07

## 2024-12-19 NOTE — PROGRESS NOTES
Subjective   Patient ID: Torres Price is a 79 y.o. male who presents for Follow-up (Follow up visit - fatigue./Pt will take a flu vaccine today.).  HPI  Follow Up sxs of fatigue / low energy at times (unless doing something he is super focused on)  .  I started him on Metformin and wanted to followup on this as well.  Takes/ tolerates     Sometimes freezes , after been working for a while, can't just get up and move onto the next thing    Back - giving him more problems/ painful    Hx of degen disc disease     Numbness in penis . Ortiz in the morning.   No difficulty urinating , or defecation .    Able to get an erection and ejaculate.      Review of Systems    Objective   /65 (BP Location: Left arm, Patient Position: Sitting, BP Cuff Size: Large adult)   Pulse 64   Temp 36.5 °C (97.7 °F) (Temporal)   Resp 14   Wt 99.5 kg (219 lb 4.8 oz)   SpO2 94%   BMI 32.38 kg/m²     Physical Exam  Alert, no distress   Assessment/Plan   Problem List Items Addressed This Visit          High    DM2 (diabetes mellitus, type 2) (Multi)    Relevant Medications    metFORMIN XR (Glucophage-XR) 500 mg 24 hr tablet    Other Relevant Orders    Hemoglobin A1C    Lipid Panel    Comprehensive Metabolic Panel     Lab Results   Component Value Date    HGBA1C 7.6 (H) 12/16/2024       Same med, but increase activity . Fairly sedentary  .   Regular back stretches for his back pain .  Pt aware,  admits not adherent to regular execise/movement / stretches    Penile numbness. Recommend cs with Urology .  Declines at this time.     Follow up  April 2024   JORDON Carranza MD

## 2025-01-27 DIAGNOSIS — I25.10 CORONARY ARTERY DISEASE INVOLVING NATIVE CORONARY ARTERY OF NATIVE HEART WITHOUT ANGINA PECTORIS: ICD-10-CM

## 2025-01-27 DIAGNOSIS — E78.5 HYPERLIPIDEMIA, UNSPECIFIED HYPERLIPIDEMIA TYPE: Primary | ICD-10-CM

## 2025-01-27 NOTE — TELEPHONE ENCOUNTER
Pt called in requesting refill on two prescriptions atorvastatin (Lipitor) 80 mg tablet and carvedilol (Coreg) 6.25 mg tablet sent over to pharmacy on file

## 2025-01-29 RX ORDER — CARVEDILOL 6.25 MG/1
6.25 TABLET ORAL 2 TIMES DAILY
Qty: 180 TABLET | Refills: 1 | Status: SHIPPED | OUTPATIENT
Start: 2025-01-29

## 2025-01-29 RX ORDER — ATORVASTATIN CALCIUM 80 MG/1
80 TABLET, FILM COATED ORAL DAILY
Qty: 90 TABLET | Refills: 1 | Status: SHIPPED | OUTPATIENT
Start: 2025-01-29

## 2025-02-06 ENCOUNTER — OFFICE VISIT (OUTPATIENT)
Dept: PRIMARY CARE | Facility: CLINIC | Age: 80
End: 2025-02-06
Payer: MEDICARE

## 2025-02-06 VITALS
WEIGHT: 225 LBS | BODY MASS INDEX: 33.23 KG/M2 | SYSTOLIC BLOOD PRESSURE: 98 MMHG | HEART RATE: 68 BPM | DIASTOLIC BLOOD PRESSURE: 63 MMHG | TEMPERATURE: 97.6 F | RESPIRATION RATE: 14 BRPM | OXYGEN SATURATION: 95 %

## 2025-02-06 DIAGNOSIS — M17.12 ARTHRITIS OF LEFT KNEE: Primary | ICD-10-CM

## 2025-02-06 DIAGNOSIS — E11.9 TYPE 2 DIABETES MELLITUS WITHOUT COMPLICATION, WITHOUT LONG-TERM CURRENT USE OF INSULIN (MULTI): ICD-10-CM

## 2025-02-06 PROCEDURE — 1125F AMNT PAIN NOTED PAIN PRSNT: CPT | Performed by: FAMILY MEDICINE

## 2025-02-06 PROCEDURE — 1036F TOBACCO NON-USER: CPT | Performed by: FAMILY MEDICINE

## 2025-02-06 PROCEDURE — 99214 OFFICE O/P EST MOD 30 MIN: CPT | Performed by: FAMILY MEDICINE

## 2025-02-06 PROCEDURE — 1123F ACP DISCUSS/DSCN MKR DOCD: CPT | Performed by: FAMILY MEDICINE

## 2025-02-06 PROCEDURE — 1159F MED LIST DOCD IN RCRD: CPT | Performed by: FAMILY MEDICINE

## 2025-02-06 RX ORDER — MELOXICAM 15 MG/1
TABLET ORAL
Qty: 30 TABLET | Refills: 0 | Status: SHIPPED | OUTPATIENT
Start: 2025-02-06

## 2025-02-06 ASSESSMENT — PAIN SCALES - GENERAL: PAINLEVEL_OUTOF10: 8

## 2025-02-06 NOTE — PROGRESS NOTES
Subjective   Patient ID: Torres Price is a 79 y.o. male who presents for Knee Pain (Pt presents for left knee pain X4-5 days. No known injury. Pt asking for handicap placard.).  HPI  Sxs as in nurse note.   No fever. No redness,  swollen per wife.   Tx : none    No injury.  No prior episodes.     Wore his son's knee wrap (elastic) for the first couple of days. But not the last couple .     Review of Systems    Objective   BP 98/63 (BP Location: Left arm, Patient Position: Sitting, BP Cuff Size: Large adult)   Pulse 68   Temp 36.4 °C (97.6 °F) (Temporal)   Resp 14   Wt 102 kg (225 lb)   SpO2 95%   BMI 33.23 kg/m²     Physical Exam  Swollen warm left knee. Able to move flex/ extend, pain at ends of rom .   Normal distal pulses   n  2 inch x1/4 in scabbed horizintal skin abrasion in crease of the knee  .    Assessment/Plan   Problem List Items Addressed This Visit          High    DM2 (diabetes mellitus, type 2) (Multi)     Other Visit Diagnoses       Arthritis of left knee    -  Primary    Relevant Medications    meloxicam (Mobic) 15 mg tablet    Other Relevant Orders    Disability Placard        Requests printed rx and disability placard    .  Provided.       OA  of knee, suspected  - New dxis .  Reviewed etiology ,  dxis,   eval, tx and follow up . Questions addressed . Recommend meloxicam, heat and gentle rom , and activity as tolerated , for 7 to 10 d. If worsening over that time, would do xray and consisder lab/ followup visit.   To notiyf us if worsening     Skin abrasion .  Nothing needs done topically.  To notify me if opens/ oozing.       JORDON Carranza MD

## 2025-02-26 ENCOUNTER — APPOINTMENT (OUTPATIENT)
Dept: PRIMARY CARE | Facility: CLINIC | Age: 80
End: 2025-02-26
Payer: MEDICARE

## 2025-03-07 DIAGNOSIS — E11.9 TYPE 2 DIABETES MELLITUS WITHOUT COMPLICATION, WITHOUT LONG-TERM CURRENT USE OF INSULIN (MULTI): ICD-10-CM

## 2025-03-07 RX ORDER — METFORMIN HYDROCHLORIDE 500 MG/1
500 TABLET, EXTENDED RELEASE ORAL
Qty: 100 TABLET | Refills: 1 | Status: SHIPPED | OUTPATIENT
Start: 2025-03-07 | End: 2025-09-23

## 2025-04-23 LAB
ALBUMIN SERPL-MCNC: 4.3 G/DL (ref 3.6–5.1)
ALP SERPL-CCNC: 61 U/L (ref 35–144)
ALT SERPL-CCNC: 53 U/L (ref 9–46)
ANION GAP SERPL CALCULATED.4IONS-SCNC: 6 MMOL/L (CALC) (ref 7–17)
AST SERPL-CCNC: 28 U/L (ref 10–35)
BILIRUB SERPL-MCNC: 0.7 MG/DL (ref 0.2–1.2)
BUN SERPL-MCNC: 18 MG/DL (ref 7–25)
CALCIUM SERPL-MCNC: 9 MG/DL (ref 8.6–10.3)
CHLORIDE SERPL-SCNC: 104 MMOL/L (ref 98–110)
CHOLEST SERPL-MCNC: 123 MG/DL
CHOLEST/HDLC SERPL: 2.9 (CALC)
CO2 SERPL-SCNC: 27 MMOL/L (ref 20–32)
CREAT SERPL-MCNC: 0.86 MG/DL (ref 0.7–1.22)
EGFRCR SERPLBLD CKD-EPI 2021: 88 ML/MIN/1.73M2
EST. AVERAGE GLUCOSE BLD GHB EST-MCNC: 197 MG/DL
EST. AVERAGE GLUCOSE BLD GHB EST-SCNC: 10.9 MMOL/L
GLUCOSE SERPL-MCNC: 156 MG/DL (ref 65–99)
HBA1C MFR BLD: 8.5 %
HDLC SERPL-MCNC: 43 MG/DL
LDLC SERPL CALC-MCNC: 62 MG/DL (CALC)
NONHDLC SERPL-MCNC: 80 MG/DL (CALC)
POTASSIUM SERPL-SCNC: 4.1 MMOL/L (ref 3.5–5.3)
PROT SERPL-MCNC: 7.2 G/DL (ref 6.1–8.1)
SODIUM SERPL-SCNC: 137 MMOL/L (ref 135–146)
TRIGL SERPL-MCNC: 93 MG/DL
VIT B12 SERPL-MCNC: 1111 PG/ML (ref 200–1100)

## 2025-04-24 ENCOUNTER — APPOINTMENT (OUTPATIENT)
Dept: PRIMARY CARE | Facility: CLINIC | Age: 80
End: 2025-04-24
Payer: MEDICARE

## 2025-04-24 VITALS
RESPIRATION RATE: 14 BRPM | TEMPERATURE: 97.8 F | OXYGEN SATURATION: 93 % | DIASTOLIC BLOOD PRESSURE: 72 MMHG | BODY MASS INDEX: 32.94 KG/M2 | HEART RATE: 81 BPM | SYSTOLIC BLOOD PRESSURE: 117 MMHG | WEIGHT: 222.4 LBS | HEIGHT: 69 IN

## 2025-04-24 DIAGNOSIS — E11.65 TYPE 2 DIABETES MELLITUS WITH HYPERGLYCEMIA, WITHOUT LONG-TERM CURRENT USE OF INSULIN: Primary | ICD-10-CM

## 2025-04-24 PROCEDURE — 1123F ACP DISCUSS/DSCN MKR DOCD: CPT | Performed by: FAMILY MEDICINE

## 2025-04-24 PROCEDURE — G2211 COMPLEX E/M VISIT ADD ON: HCPCS | Performed by: FAMILY MEDICINE

## 2025-04-24 PROCEDURE — 1160F RVW MEDS BY RX/DR IN RCRD: CPT | Performed by: FAMILY MEDICINE

## 2025-04-24 PROCEDURE — 99214 OFFICE O/P EST MOD 30 MIN: CPT | Performed by: FAMILY MEDICINE

## 2025-04-24 PROCEDURE — 1159F MED LIST DOCD IN RCRD: CPT | Performed by: FAMILY MEDICINE

## 2025-04-24 PROCEDURE — 1036F TOBACCO NON-USER: CPT | Performed by: FAMILY MEDICINE

## 2025-04-24 RX ORDER — METFORMIN HYDROCHLORIDE 500 MG/1
1000 TABLET, EXTENDED RELEASE ORAL
Qty: 180 TABLET | Refills: 1 | Status: SHIPPED | OUTPATIENT
Start: 2025-04-24 | End: 2025-10-21

## 2025-04-24 NOTE — PROGRESS NOTES
" Subjective   Patient ID: Torres Price is a 79 y.o. male who presents for Follow-up.  HPI    Followup visit  .  Last routine visit 12/2024  Interval  2/2025  visit , knee pain'    DM2, knee arthritis ,  CAD, HL  ,Hx STEMI    Review of Systems denies cp, sob.  Prior knee pain has resolved.       Objective   /72 (BP Location: Left arm, Patient Position: Sitting, BP Cuff Size: Large adult)   Pulse 81   Temp 36.6 °C (97.8 °F) (Temporal)   Resp 14   Ht 1.74 m (5' 8.5\")   Wt 101 kg (222 lb 6.4 oz)   SpO2 93%   BMI 33.32 kg/m²     Physical Examalert. No acute distress   Cv reg   Lungs clear no wheeze, rales     Assessment/Plan   Problem List Items Addressed This Visit          High    DM2 (diabetes mellitus, type 2) (Multi) - Primary    Relevant Orders    Follow Up In [facility] - PCP    Albumin-Creatinine Ratio, Urine Random    CBC and Auto Differential    Comprehensive Metabolic Panel    Lipid Panel    Hemoglobin A1C     DM2 w hyperglycemia .  Incrase metforim   Recommend reducing sugars and carbs in diet. Recommend regular activity/ exercise . Recommend wt loss    Hx of penile numbness   Recommend Urology, pt declines. States he will let me know if changes mind .     Knee arthritis -improved.     Follow up discussailyn requests appt in August.   Plans to move (to be with family )  sometime soon. But will still be in this area in August.       JORDON Carranza MD  "

## 2025-04-24 NOTE — PATIENT INSTRUCTIONS
, today we changed the Metformin from 500 mg a day to 1000 mg a day .   Your B12 blood tests shows a high B12 level, you can take less B12 ( for example  take the vitamin 5 days of the week, instead of 7)

## 2025-06-09 DIAGNOSIS — I25.10 CORONARY ARTERY DISEASE INVOLVING NATIVE CORONARY ARTERY OF NATIVE HEART WITHOUT ANGINA PECTORIS: ICD-10-CM

## 2025-06-09 NOTE — TELEPHONE ENCOUNTER
Request refill on carvedilol 6.25 mg taken 2x a day to be called into [facility] in [facility] [place] does have an appointment on book

## 2025-06-10 RX ORDER — CARVEDILOL 6.25 MG/1
6.25 TABLET ORAL 2 TIMES DAILY
Qty: 180 TABLET | Refills: 1 | Status: SHIPPED | OUTPATIENT
Start: 2025-06-10

## 2025-08-21 DIAGNOSIS — E11.65 TYPE 2 DIABETES MELLITUS WITH HYPERGLYCEMIA, WITHOUT LONG-TERM CURRENT USE OF INSULIN: ICD-10-CM

## 2025-08-25 ENCOUNTER — TELEPHONE (OUTPATIENT)
Dept: PRIMARY CARE | Facility: CLINIC | Age: 80
End: 2025-08-25
Payer: MEDICARE

## 2025-08-25 DIAGNOSIS — E11.9 TYPE 2 DIABETES MELLITUS WITHOUT COMPLICATION, WITHOUT LONG-TERM CURRENT USE OF INSULIN: Primary | ICD-10-CM

## 2025-08-26 LAB
ALBUMIN SERPL-MCNC: 4.3 G/DL (ref 3.6–5.1)
ALP SERPL-CCNC: 60 U/L (ref 35–144)
ALT SERPL-CCNC: 58 U/L (ref 9–46)
ANION GAP SERPL CALCULATED.4IONS-SCNC: 10 MMOL/L (CALC) (ref 7–17)
AST SERPL-CCNC: 32 U/L (ref 10–35)
BASOPHILS # BLD AUTO: 42 CELLS/UL (ref 0–200)
BASOPHILS NFR BLD AUTO: 0.9 %
BILIRUB SERPL-MCNC: 0.7 MG/DL (ref 0.2–1.2)
BUN SERPL-MCNC: 19 MG/DL (ref 7–25)
CALCIUM SERPL-MCNC: 9.5 MG/DL (ref 8.6–10.3)
CHLORIDE SERPL-SCNC: 105 MMOL/L (ref 98–110)
CHOLEST SERPL-MCNC: 99 MG/DL
CHOLEST/HDLC SERPL: 2.5 (CALC)
CO2 SERPL-SCNC: 25 MMOL/L (ref 20–32)
CREAT SERPL-MCNC: 0.92 MG/DL (ref 0.7–1.22)
EGFRCR SERPLBLD CKD-EPI 2021: 84 ML/MIN/1.73M2
EOSINOPHIL # BLD AUTO: 61 CELLS/UL (ref 15–500)
EOSINOPHIL NFR BLD AUTO: 1.3 %
ERYTHROCYTE [DISTWIDTH] IN BLOOD BY AUTOMATED COUNT: 12.2 % (ref 11–15)
EST. AVERAGE GLUCOSE BLD GHB EST-MCNC: 174 MG/DL
EST. AVERAGE GLUCOSE BLD GHB EST-SCNC: 9.7 MMOL/L
GLUCOSE SERPL-MCNC: 139 MG/DL (ref 65–99)
HBA1C MFR BLD: 7.7 %
HCT VFR BLD AUTO: 42.7 % (ref 38.5–50)
HDLC SERPL-MCNC: 40 MG/DL
HGB BLD-MCNC: 14.1 G/DL (ref 13.2–17.1)
LDLC SERPL CALC-MCNC: 41 MG/DL (CALC)
LYMPHOCYTES # BLD AUTO: 1645 CELLS/UL (ref 850–3900)
LYMPHOCYTES NFR BLD AUTO: 35 %
MCH RBC QN AUTO: 33.6 PG (ref 27–33)
MCHC RBC AUTO-ENTMCNC: 33 G/DL (ref 32–36)
MCV RBC AUTO: 101.7 FL (ref 80–100)
MONOCYTES # BLD AUTO: 545 CELLS/UL (ref 200–950)
MONOCYTES NFR BLD AUTO: 11.6 %
NEUTROPHILS # BLD AUTO: 2406 CELLS/UL (ref 1500–7800)
NEUTROPHILS NFR BLD AUTO: 51.2 %
NONHDLC SERPL-MCNC: 59 MG/DL (CALC)
PLATELET # BLD AUTO: 176 THOUSAND/UL (ref 140–400)
PMV BLD REES-ECKER: 10.7 FL (ref 7.5–12.5)
POTASSIUM SERPL-SCNC: 4.2 MMOL/L (ref 3.5–5.3)
PROT SERPL-MCNC: 7.1 G/DL (ref 6.1–8.1)
RBC # BLD AUTO: 4.2 MILLION/UL (ref 4.2–5.8)
SODIUM SERPL-SCNC: 140 MMOL/L (ref 135–146)
TRIGL SERPL-MCNC: 97 MG/DL
WBC # BLD AUTO: 4.7 THOUSAND/UL (ref 3.8–10.8)

## 2025-08-27 LAB
ALBUMIN/CREAT UR: 2 MG/G CREAT
CREAT UR-MCNC: 114 MG/DL (ref 20–320)
MICROALBUMIN UR-MCNC: 0.2 MG/DL

## 2025-08-28 ENCOUNTER — APPOINTMENT (OUTPATIENT)
Dept: PRIMARY CARE | Facility: CLINIC | Age: 80
End: 2025-08-28
Payer: MEDICARE

## 2025-08-28 VITALS
OXYGEN SATURATION: 94 % | RESPIRATION RATE: 14 BRPM | DIASTOLIC BLOOD PRESSURE: 68 MMHG | SYSTOLIC BLOOD PRESSURE: 114 MMHG | HEIGHT: 69 IN | TEMPERATURE: 97.1 F | WEIGHT: 218.8 LBS | BODY MASS INDEX: 32.41 KG/M2 | HEART RATE: 59 BPM

## 2025-08-28 DIAGNOSIS — N52.9 ERECTILE DYSFUNCTION, UNSPECIFIED ERECTILE DYSFUNCTION TYPE: ICD-10-CM

## 2025-08-28 DIAGNOSIS — E11.65 TYPE 2 DIABETES MELLITUS WITH HYPERGLYCEMIA, WITHOUT LONG-TERM CURRENT USE OF INSULIN: ICD-10-CM

## 2025-08-28 DIAGNOSIS — I25.10 CORONARY ARTERY DISEASE INVOLVING NATIVE CORONARY ARTERY OF NATIVE HEART WITHOUT ANGINA PECTORIS: ICD-10-CM

## 2025-08-28 DIAGNOSIS — Z00.00 ROUTINE GENERAL MEDICAL EXAMINATION AT HEALTH CARE FACILITY: Primary | ICD-10-CM

## 2025-08-28 DIAGNOSIS — R21 RASH OF GROIN: ICD-10-CM

## 2025-08-28 PROCEDURE — 1158F ADVNC CARE PLAN TLK DOCD: CPT | Performed by: FAMILY MEDICINE

## 2025-08-28 PROCEDURE — G0439 PPPS, SUBSEQ VISIT: HCPCS | Performed by: FAMILY MEDICINE

## 2025-08-28 PROCEDURE — 1160F RVW MEDS BY RX/DR IN RCRD: CPT | Performed by: FAMILY MEDICINE

## 2025-08-28 PROCEDURE — 99213 OFFICE O/P EST LOW 20 MIN: CPT | Performed by: FAMILY MEDICINE

## 2025-08-28 PROCEDURE — 1036F TOBACCO NON-USER: CPT | Performed by: FAMILY MEDICINE

## 2025-08-28 PROCEDURE — 1170F FXNL STATUS ASSESSED: CPT | Performed by: FAMILY MEDICINE

## 2025-08-28 PROCEDURE — 1159F MED LIST DOCD IN RCRD: CPT | Performed by: FAMILY MEDICINE

## 2025-08-28 RX ORDER — METFORMIN HYDROCHLORIDE 500 MG/1
500 TABLET, EXTENDED RELEASE ORAL
Qty: 180 TABLET | Refills: 1 | Status: SHIPPED | OUTPATIENT
Start: 2025-08-28 | End: 2026-02-24

## 2025-08-28 RX ORDER — NYSTATIN 100000 [USP'U]/G
1 POWDER TOPICAL 2 TIMES DAILY
Qty: 60 G | Refills: 2 | Status: SHIPPED | OUTPATIENT
Start: 2025-08-28 | End: 2026-08-28

## 2025-08-28 RX ORDER — SILDENAFIL 100 MG/1
100 TABLET, FILM COATED ORAL AS NEEDED
Qty: 30 TABLET | Refills: 1 | Status: SHIPPED | OUTPATIENT
Start: 2025-08-28

## 2025-08-28 ASSESSMENT — ACTIVITIES OF DAILY LIVING (ADL)
MANAGING_FINANCES: INDEPENDENT
DRESSING: INDEPENDENT
DOING_HOUSEWORK: INDEPENDENT
TAKING_MEDICATION: INDEPENDENT
BATHING: INDEPENDENT
GROCERY_SHOPPING: INDEPENDENT

## 2025-08-28 ASSESSMENT — PATIENT HEALTH QUESTIONNAIRE - PHQ9
2. FEELING DOWN, DEPRESSED OR HOPELESS: NOT AT ALL
1. LITTLE INTEREST OR PLEASURE IN DOING THINGS: NOT AT ALL
SUM OF ALL RESPONSES TO PHQ9 QUESTIONS 1 AND 2: 0